# Patient Record
Sex: MALE | Race: OTHER | NOT HISPANIC OR LATINO
[De-identification: names, ages, dates, MRNs, and addresses within clinical notes are randomized per-mention and may not be internally consistent; named-entity substitution may affect disease eponyms.]

---

## 2019-06-03 ENCOUNTER — RECORD ABSTRACTING (OUTPATIENT)
Age: 49
End: 2019-06-03

## 2019-06-03 DIAGNOSIS — Z86.69 PERSONAL HISTORY OF OTHER DISEASES OF THE NERVOUS SYSTEM AND SENSE ORGANS: ICD-10-CM

## 2019-06-03 DIAGNOSIS — Z86.39 PERSONAL HISTORY OF OTHER ENDOCRINE, NUTRITIONAL AND METABOLIC DISEASE: ICD-10-CM

## 2019-06-03 PROBLEM — Z00.00 ENCOUNTER FOR PREVENTIVE HEALTH EXAMINATION: Status: ACTIVE | Noted: 2019-06-03

## 2019-06-22 ENCOUNTER — APPOINTMENT (OUTPATIENT)
Dept: ENDOCRINOLOGY | Facility: CLINIC | Age: 49
End: 2019-06-22
Payer: MEDICARE

## 2019-06-22 VITALS
HEART RATE: 84 BPM | WEIGHT: 279 LBS | HEIGHT: 71 IN | BODY MASS INDEX: 39.06 KG/M2 | OXYGEN SATURATION: 98 % | DIASTOLIC BLOOD PRESSURE: 90 MMHG | SYSTOLIC BLOOD PRESSURE: 154 MMHG

## 2019-06-22 PROCEDURE — 99214 OFFICE O/P EST MOD 30 MIN: CPT

## 2019-06-22 RX ORDER — ZOLPIDEM TARTRATE 10 MG/1
10 TABLET, FILM COATED ORAL
Refills: 0 | Status: DISCONTINUED | COMMUNITY
End: 2019-06-22

## 2019-06-22 RX ORDER — TESTOSTERONE 10 MG/G
GEL TRANSDERMAL
Refills: 0 | Status: DISCONTINUED | COMMUNITY
End: 2019-06-22

## 2019-06-22 RX ORDER — LISINOPRIL 10 MG/1
10 TABLET ORAL
Refills: 0 | Status: DISCONTINUED | COMMUNITY
End: 2019-06-22

## 2019-06-22 RX ORDER — ATORVASTATIN CALCIUM 20 MG/1
20 TABLET, FILM COATED ORAL
Refills: 0 | Status: DISCONTINUED | COMMUNITY
End: 2019-06-22

## 2019-06-22 NOTE — HISTORY OF PRESENT ILLNESS
[FreeTextEntry1] : Pt generally without new complaints.Pt with marked increase in A1c.Diet/ exercise reviewed in great detail.Lipids not adequate with increased LDL.\par \par Pt compliant with meds but trying Atkins type diet. P

## 2019-06-22 NOTE — ASSESSMENT
[FreeTextEntry1] : Pt with marked increase in A1c.Diet/ exercise reviewed in great detail.Lipids not adequate with increased LDL.

## 2020-02-29 ENCOUNTER — APPOINTMENT (OUTPATIENT)
Dept: ENDOCRINOLOGY | Facility: CLINIC | Age: 50
End: 2020-02-29
Payer: MEDICARE

## 2020-02-29 VITALS
SYSTOLIC BLOOD PRESSURE: 140 MMHG | WEIGHT: 300 LBS | DIASTOLIC BLOOD PRESSURE: 90 MMHG | HEART RATE: 86 BPM | OXYGEN SATURATION: 98 % | HEIGHT: 71 IN | BODY MASS INDEX: 42 KG/M2

## 2020-02-29 PROCEDURE — 99215 OFFICE O/P EST HI 40 MIN: CPT

## 2020-02-29 NOTE — PHYSICAL EXAM
[No Acute Distress] : no acute distress [Alert] : alert [Well Nourished] : well nourished [Well Developed] : well developed [Normal Sclera/Conjunctiva] : normal sclera/conjunctiva [EOMI] : extra ocular movement intact [No Proptosis] : no proptosis [Normal Oropharynx] : the oropharynx was normal [Thyroid Not Enlarged] : the thyroid was not enlarged [No Thyroid Nodules] : there were no palpable thyroid nodules [No Respiratory Distress] : no respiratory distress [No Accessory Muscle Use] : no accessory muscle use [Clear to Auscultation] : lungs were clear to auscultation bilaterally [Normal Rate] : heart rate was normal  [Normal S1, S2] : normal S1 and S2 [Regular Rhythm] : with a regular rhythm [No Edema] : there was no peripheral edema [Pedal Pulses Normal] : the pedal pulses are present [Normal Bowel Sounds] : normal bowel sounds [Not Tender] : non-tender [Not Distended] : not distended [Soft] : abdomen soft [Post Cervical Nodes] : posterior cervical nodes [Anterior Cervical Nodes] : anterior cervical nodes [Axillary Nodes] : axillary nodes [No Spinal Tenderness] : no spinal tenderness [Normal] : normal and non tender [Spine Straight] : spine straight [No Stigmata of Cushings Syndrome] : no stigmata of cushings syndrome [Normal Gait] : normal gait [No Rash] : no rash [Normal Strength/Tone] : muscle strength and tone were normal [Normal Reflexes] : deep tendon reflexes were 2+ and symmetric [No Tremors] : no tremors [Oriented x3] : oriented to person, place, and time [Acanthosis Nigricans] : no acanthosis nigricans

## 2020-02-29 NOTE — ASSESSMENT
[FreeTextEntry1] :  Pt. was seen and examined and we had long discussion regarding diabetic complication risks (eye exam, examining feet). Labs were fully reviewed with pt. including FBS, HbA1c,(9.1 down from 10.8)  and micro albumin and lipids. Appropriate recommendation for eye and foot care were reviewed.\par  Counseling regarding diet, home monitoring and exercise was provided. \par Values are improving  and no new symptoms of concern (discussed neuropathy symptoms, C/p, cardiac issues and GI). To stay with current regimen. \par Lipid levels were reviewed with patient and importance and function of LDL, HDL and triglycerides discussed. Methods to increase HDL (exercise, fish, beans, oat meal, legumes etc.) discussed with pt. in conjunction with measures to decrease LDL including diet and exercise.LDL/HDL was 142/38.Pt had been non compliant with lipitor\par Pt. has hypogonadism with low T and free T

## 2020-03-24 ENCOUNTER — TRANSCRIPTION ENCOUNTER (OUTPATIENT)
Age: 50
End: 2020-03-24

## 2020-04-18 ENCOUNTER — APPOINTMENT (OUTPATIENT)
Dept: ENDOCRINOLOGY | Facility: CLINIC | Age: 50
End: 2020-04-18
Payer: MEDICARE

## 2020-04-18 PROCEDURE — G2012 BRIEF CHECK IN BY MD/QHP: CPT

## 2020-08-01 ENCOUNTER — APPOINTMENT (OUTPATIENT)
Dept: ENDOCRINOLOGY | Facility: CLINIC | Age: 50
End: 2020-08-01
Payer: MEDICARE

## 2020-08-01 ENCOUNTER — APPOINTMENT (OUTPATIENT)
Dept: ENDOCRINOLOGY | Facility: CLINIC | Age: 50
End: 2020-08-01

## 2020-08-01 VITALS — BODY MASS INDEX: 38.35 KG/M2 | WEIGHT: 275 LBS

## 2020-08-01 PROCEDURE — G2012 BRIEF CHECK IN BY MD/QHP: CPT

## 2020-08-01 RX ORDER — LISINOPRIL 10 MG/1
10 TABLET ORAL
Qty: 90 | Refills: 3 | Status: DISCONTINUED | COMMUNITY
Start: 2020-02-29 | End: 2020-08-01

## 2020-08-01 RX ORDER — DULAGLUTIDE 1.5 MG/.5ML
1.5 INJECTION, SOLUTION SUBCUTANEOUS
Refills: 0 | Status: DISCONTINUED | COMMUNITY
End: 2020-08-01

## 2020-08-01 RX ORDER — METFORMIN ER 500 MG 500 MG/1
500 TABLET ORAL
Qty: 360 | Refills: 0 | Status: DISCONTINUED | COMMUNITY
Start: 2020-02-21 | End: 2020-08-01

## 2020-11-09 LAB — 25(OH)D3 SERPL-MCNC: 36 NG/ML

## 2020-11-10 LAB
ALBUMIN SERPL ELPH-MCNC: 4.7 G/DL
ALP BLD-CCNC: 62 U/L
ALT SERPL-CCNC: 15 U/L
ANION GAP SERPL CALC-SCNC: 15 MMOL/L
AST SERPL-CCNC: 14 U/L
BASOPHILS # BLD AUTO: 0.05 K/UL
BASOPHILS NFR BLD AUTO: 0.6 %
BILIRUB SERPL-MCNC: 0.5 MG/DL
BUN SERPL-MCNC: 20 MG/DL
CALCIUM SERPL-MCNC: 10.1 MG/DL
CHLORIDE SERPL-SCNC: 106 MMOL/L
CHOLEST SERPL-MCNC: 236 MG/DL
CO2 SERPL-SCNC: 25 MMOL/L
CREAT SERPL-MCNC: 1.1 MG/DL
CREAT SPEC-SCNC: 93 MG/DL
EOSINOPHIL # BLD AUTO: 0.19 K/UL
EOSINOPHIL NFR BLD AUTO: 2.5 %
ESTIMATED AVERAGE GLUCOSE: 154 MG/DL
GLUCOSE SERPL-MCNC: 116 MG/DL
HBA1C MFR BLD HPLC: 7 %
HCT VFR BLD CALC: 46.1 %
HDLC SERPL-MCNC: 42 MG/DL
HGB BLD-MCNC: 15 G/DL
IMM GRANULOCYTES NFR BLD AUTO: 0.4 %
LDLC SERPL CALC-MCNC: 159 MG/DL
LYMPHOCYTES # BLD AUTO: 2.24 K/UL
LYMPHOCYTES NFR BLD AUTO: 28.9 %
MAN DIFF?: NORMAL
MCHC RBC-ENTMCNC: 29.4 PG
MCHC RBC-ENTMCNC: 32.5 G/DL
MCV RBC AUTO: 90.4 FL
MICROALBUMIN 24H UR DL<=1MG/L-MCNC: 19.8 MG/DL
MICROALBUMIN/CREAT 24H UR-RTO: 213 MG/G
MONOCYTES # BLD AUTO: 0.46 K/UL
MONOCYTES NFR BLD AUTO: 5.9 %
NEUTROPHILS # BLD AUTO: 4.77 K/UL
NEUTROPHILS NFR BLD AUTO: 61.7 %
NONHDLC SERPL-MCNC: 194 MG/DL
PLATELET # BLD AUTO: 285 K/UL
POTASSIUM SERPL-SCNC: 4.5 MMOL/L
PROT SERPL-MCNC: 7.2 G/DL
RBC # BLD: 5.1 M/UL
RBC # FLD: 12.3 %
SARS-COV-2 IGG SERPL IA-ACNC: 136 INDEX
SARS-COV-2 IGG SERPL QL IA: POSITIVE
SODIUM SERPL-SCNC: 146 MMOL/L
TRIGL SERPL-MCNC: 284 MG/DL
VIT B12 SERPL-MCNC: 400 PG/ML
WBC # FLD AUTO: 7.74 K/UL

## 2020-11-14 ENCOUNTER — APPOINTMENT (OUTPATIENT)
Dept: ENDOCRINOLOGY | Facility: CLINIC | Age: 50
End: 2020-11-14
Payer: MEDICARE

## 2020-11-14 VITALS
BODY MASS INDEX: 38.94 KG/M2 | WEIGHT: 279.19 LBS | DIASTOLIC BLOOD PRESSURE: 86 MMHG | HEART RATE: 99 BPM | SYSTOLIC BLOOD PRESSURE: 189 MMHG | TEMPERATURE: 94.7 F | RESPIRATION RATE: 20 BRPM

## 2020-11-14 PROCEDURE — 99214 OFFICE O/P EST MOD 30 MIN: CPT

## 2020-11-14 NOTE — ASSESSMENT
[FreeTextEntry1] : I had a long discussion with patient regarding diabetes control including home readings. Goal HbA1c was discussed and counseling provided regarding diet/exercise and complications of diabetes (renal and cardiac and neurologic as well as  and need for eye exams and examination of feet. Lipids and importance of BP control also reviewed. HbA1c currently at  9.1--> 6.8 --> 7.0. and microalbumin 213 mcg/mg creatinine.\par Lipid levels were reviewed with patient and importance and function of LDL, HDL and triglycerides  { 399--> 284 discussed. Methods to increase HDL (exercise, fish, beans, oat meal, legumes etc.) discussed with pt. in conjunction with measures to decrease LDL and triglycerides  including diet and exercise.LDL/HDL was 142/38--> 159/42 . Current regimen of treatment to continue. Risks of statins were discussed in detail. Crestor 5 discussed.\par Pt. is s/p COVID with + antibodies.\par Pt. has low libido and low energy with issues related to function as well. Pt. has had  low total  testosterone and values pending  . Testosterone replacement therapy risks and benefits reviewed in detail.. Labs values were discussed in great detail. Risks of TRT and physiology discussed particularly issues related to suppression of pituitary and side effects of TRT. Issues of breast tenderness, acne, urination difficulties were fully reviewed.  To follow values.\par \par

## 2020-11-15 LAB
TESTOST BND SERPL-MCNC: 14.9 PG/ML
TESTOST SERPL-MCNC: 258.1 NG/DL

## 2021-03-26 PROBLEM — U07.1 COVID-19: Status: RESOLVED | Noted: 2021-03-26 | Resolved: 2021-03-26

## 2021-03-27 ENCOUNTER — APPOINTMENT (OUTPATIENT)
Dept: ENDOCRINOLOGY | Facility: CLINIC | Age: 51
End: 2021-03-27
Payer: MEDICARE

## 2021-03-27 VITALS
HEART RATE: 99 BPM | BODY MASS INDEX: 38.65 KG/M2 | SYSTOLIC BLOOD PRESSURE: 126 MMHG | WEIGHT: 276.1 LBS | TEMPERATURE: 97 F | OXYGEN SATURATION: 100 % | DIASTOLIC BLOOD PRESSURE: 70 MMHG | HEIGHT: 71 IN

## 2021-03-27 DIAGNOSIS — U07.1 COVID-19: ICD-10-CM

## 2021-03-27 PROCEDURE — 99214 OFFICE O/P EST MOD 30 MIN: CPT | Mod: CS

## 2021-03-27 NOTE — ASSESSMENT
[FreeTextEntry1] : I had a long discussion with patient regarding diabetes control including home readings. Goal HbA1c was discussed and counseling provided regarding diet/exercise and complications of diabetes (renal and cardiac and neurologic as well as  and need for eye exams and examination of feet. Lipids and importance of BP control also reviewed. HbA1c currently at  9.1--> 6.8 --> 7.0--> 7.9. and microalbumin 213 => 218 mcg/mg creatinine.\par Lipid levels were reviewed with patient and importance and function of LDL, HDL and triglycerides     { 399--> 284 -> 224 discussed. Methods to increase HDL (exercise, fish, beans, oat meal, legumes etc.) discussed with pt. in conjunction with measures to decrease LDL and triglycerides  including diet and exercise.LDL/HDL was 142/38--> 159/42--> 81/36. . Current regimen of treatment to continue. Risks of statins were discussed in detail. To stay with Atorvastatin 20\par Pt. is s/p COVID with + antibodies that have now converted to negative.\par Pt. has low libido and low energy with issues related to function as well. Pt. has had  low total  testosterone   . Testosterone replacement therapy risks and benefits reviewed in detail.. Labs values were discussed in great detail with value low at 292. Risks of TRT and physiology discussed particularly issues related to suppression of pituitary and side effects of TRT. Issues of breast tenderness, acne, urination difficulties were fully reviewed.  To follow values.\par Pt with stress - to try xanax .25 hs. \par

## 2021-07-21 ENCOUNTER — RX RENEWAL (OUTPATIENT)
Age: 51
End: 2021-07-21

## 2021-07-21 RX ORDER — OMEPRAZOLE 20 MG/1
20 CAPSULE, DELAYED RELEASE ORAL DAILY
Qty: 90 | Refills: 3 | Status: ACTIVE | COMMUNITY
Start: 2020-08-01 | End: 1900-01-01

## 2021-08-21 ENCOUNTER — APPOINTMENT (OUTPATIENT)
Dept: ENDOCRINOLOGY | Facility: CLINIC | Age: 51
End: 2021-08-21
Payer: MEDICARE

## 2021-08-21 VITALS
SYSTOLIC BLOOD PRESSURE: 202 MMHG | HEIGHT: 71 IN | OXYGEN SATURATION: 100 % | DIASTOLIC BLOOD PRESSURE: 98 MMHG | BODY MASS INDEX: 40.18 KG/M2 | TEMPERATURE: 94.1 F | RESPIRATION RATE: 18 BRPM | HEART RATE: 101 BPM | WEIGHT: 287 LBS

## 2021-08-21 PROCEDURE — 99214 OFFICE O/P EST MOD 30 MIN: CPT

## 2021-08-21 RX ORDER — TADALAFIL 5 MG/1
5 TABLET ORAL
Qty: 6 | Refills: 5 | Status: DISCONTINUED | COMMUNITY
Start: 2021-03-27 | End: 2021-08-21

## 2021-08-21 NOTE — ASSESSMENT
[FreeTextEntry1] : I had a long discussion with patient regarding diabetes control including home readings. Goal HbA1c was discussed and counseling provided regarding diet/exercise and complications of diabetes (renal and cardiac and neurologic as well as  and need for eye exams and examination of feet. Lipids and importance of BP control also reviewed. HbA1c currently at  8.3 from 6.8-> 7.9 so trending higher. and microalbumin improving 213 => 218=> 156 mcg/mg creatinine.\par Lipid levels were reviewed with patient and importance and function of LDL, HDL and triglycerides       { 399--> 284 -> 224 -> 189.discussed. Methods to increase HDL (exercise, fish, beans, oat meal, legumes etc.) discussed with pt. in conjunction with measures to decrease LDL and triglycerides  including diet and exercise.LDL/HDL was 142/38--> 159/42--> 81/36.=> 92/43  . Current regimen of treatment to continue. Risks of statins were discussed in detail. To stay with Atorvastatin 20\par Pt. is s/p COVID with + antibodies that have now converted to negative.\par Pt. has low libido and low energy with issues related to function as well. Pt. has had  low total  testosterone   . Testosterone replacement therapy risks and benefits reviewed in detail.. Labs values were discussed in great detail with value low at 129 down from 292. Risks of TRT and physiology discussed particularly issues related to suppression of pituitary and side effects of TRT. Issues of breast tenderness, acne, urination difficulties were fully reviewed.  To follow values.\par Pt with stress - to try xanax .25 hs.  \par Repeat /84.

## 2021-10-31 ENCOUNTER — RX RENEWAL (OUTPATIENT)
Age: 51
End: 2021-10-31

## 2021-11-13 ENCOUNTER — APPOINTMENT (OUTPATIENT)
Dept: ENDOCRINOLOGY | Facility: CLINIC | Age: 51
End: 2021-11-13

## 2022-02-24 NOTE — REASON FOR VISIT
Weak baseline swallow [Follow - Up] : a follow-up visit [DM Type 2] : DM Type 2 [Hypogonadism] : hypogonadism

## 2022-02-26 ENCOUNTER — APPOINTMENT (OUTPATIENT)
Dept: ENDOCRINOLOGY | Facility: CLINIC | Age: 52
End: 2022-02-26
Payer: MEDICARE

## 2022-02-26 VITALS
WEIGHT: 284 LBS | SYSTOLIC BLOOD PRESSURE: 200 MMHG | OXYGEN SATURATION: 98 % | DIASTOLIC BLOOD PRESSURE: 100 MMHG | HEART RATE: 109 BPM | HEIGHT: 71 IN | TEMPERATURE: 97.4 F | BODY MASS INDEX: 39.76 KG/M2

## 2022-02-26 PROCEDURE — 99213 OFFICE O/P EST LOW 20 MIN: CPT

## 2022-02-26 RX ORDER — DAPAGLIFLOZIN 5 MG/1
5 TABLET, FILM COATED ORAL DAILY
Qty: 90 | Refills: 3 | Status: DISCONTINUED | COMMUNITY
Start: 2021-08-21 | End: 2022-02-26

## 2022-02-26 NOTE — ASSESSMENT
[FreeTextEntry1] : I had a long discussion with patient regarding diabetes control including home readings. Goal HbA1c was discussed and counseling provided regarding diet/exercise and complications of diabetes (renal and cardiac and neurologic as well as  and need for eye exams and examination of feet. Lipids and importance of BP control also reviewed. HbA1c currently at 10 from  8.3  so trending higher. and previous  microalbumin 156 mcg/mg creatinine.\par Lipid levels were reviewed with patient and importance and function of LDL, HDL and triglycerides        { 399--> 284 -> 224 -> 189-> 268 ) .discussed. Methods to increase HDL (exercise, fish, beans, oat meal, legumes etc.) discussed with pt. in conjunction with measures to decrease LDL and triglycerides  including diet and exercise.LDL/HDL was 142/38--> 159/42--> 81/36.=> 92/43=> 99/41   . Current regimen of treatment to continue. Risks of statins were discussed in detail. To stay with Atorvastatin 20\par Pt. is s/p COVID with + antibodies that have now converted to negative.\par Pt. has low libido and low energy with issues related to function as well. Pt. has had  low total  testosterone   . Testosterone replacement therapy risks and benefits reviewed in detail.. Labs values were discussed in great detail with value low at 166 from 129 and previous  292. Risks of TRT and physiology discussed particularly issues related to suppression of pituitary and side effects of TRT. Issues of breast tenderness, acne, urination difficulties were fully reviewed.  To follow values.\par Pt with stress - trying  xanax .25 hs.  \par Pt. has history of hypertension. Risks related to hypertension including cardiac, renal and vascular fully discussed. Diet discussed as well. Medications and importance of compliance reviewed.\par

## 2022-03-08 ENCOUNTER — RX RENEWAL (OUTPATIENT)
Age: 52
End: 2022-03-08

## 2022-05-02 ENCOUNTER — RX RENEWAL (OUTPATIENT)
Age: 52
End: 2022-05-02

## 2022-05-28 ENCOUNTER — APPOINTMENT (OUTPATIENT)
Dept: ENDOCRINOLOGY | Facility: CLINIC | Age: 52
End: 2022-05-28
Payer: MEDICARE

## 2022-05-28 VITALS
SYSTOLIC BLOOD PRESSURE: 190 MMHG | HEART RATE: 100 BPM | BODY MASS INDEX: 39.2 KG/M2 | HEIGHT: 71 IN | WEIGHT: 280 LBS | OXYGEN SATURATION: 98 % | TEMPERATURE: 97.6 F | DIASTOLIC BLOOD PRESSURE: 110 MMHG

## 2022-05-28 VITALS — SYSTOLIC BLOOD PRESSURE: 138 MMHG | DIASTOLIC BLOOD PRESSURE: 84 MMHG

## 2022-05-28 PROCEDURE — 99213 OFFICE O/P EST LOW 20 MIN: CPT

## 2022-05-28 NOTE — ASSESSMENT
[FreeTextEntry1] : I had a long discussion with patient regarding diabetes control including home readings. Goal HbA1c was discussed and counseling provided regarding diet/exercise and complications of diabetes (renal and cardiac and neurologic as well as  and need for eye exams and examination of feet. Lipids and importance of BP control also reviewed. HbA1c currently at 7.9  so trending lower and microalbumin 259 mcg/mg creatinine.\par Lipid levels were reviewed with patient and importance and function of LDL, HDL and triglycerides          { 399--> 284 -> 224 -> 189-> 268->235   ) .discussed. Methods to increase HDL (exercise, fish, beans, oat meal, legumes etc.) discussed with pt. in conjunction with measures to decrease LDL and triglycerides  including diet and exercise.LDL/HDL was 142/38--> 159/42--> 81/36.=> 92/43=> 99/41 => 71/41   . Current regimen of treatment to continue. Risks of statins were discussed in detail. To stay with Atorvastatin 20\par Pt. is s/p COVID with + antibodies that have now converted to negative.\par Pt. has low libido and low energy with issues related to function as well. Pt. has had  low total  testosterone   . Testosterone replacement therapy risks and benefits reviewed in detail.. Labs values were discussed in great detail with value low at  138 from 166 from 129 and previous  292. Risks of TRT and physiology discussed particularly issues related to suppression of pituitary and side effects of TRT. Issues of breast tenderness, acne, urination difficulties were fully reviewed.  To follow values.To hold off on gel.\par Pt with stress - trying  xanax .25 hs.  \par Pt. has history of hypertension. Risks related to hypertension including cardiac, renal and vascular fully discussed. Diet discussed as well. Medications and importance of compliance reviewed.\par PSA was 0.18. \par BP repeat 138/84.

## 2022-08-03 ENCOUNTER — RX RENEWAL (OUTPATIENT)
Age: 52
End: 2022-08-03

## 2022-09-07 NOTE — ASSESSMENT
[FreeTextEntry1] : I had a long discussion with patient regarding diabetes control including home readings. Goal HbA1c was discussed and counseling provided regarding diet/exercise and complications of diabetes (renal and cardiac and neurologic as well as  and need for eye exams and examination of feet. Lipids and importance of BP control also reviewed. HbA1c currently at 7.7  from  7.9  so trending lower and microalbumin 143 down from  259 mcg/mg creatinine.\par Lipid levels were reviewed with patient and importance and function of LDL, HDL and triglycerides discussed. Methods to increase HDL (exercise, fish, beans, oat meal, legumes etc.) discussed with pt. in conjunction with measures to decrease LDL and triglycerides  including diet and exercise.LDL/HDL was 66/39 from 71/41. . Triglycerides were increased ,f 235 to 348. .Current regimen of treatment to continue. Risks/benefits  of statins were discussed in detail.  To stay with Atorvastatin 20. Addition of Vascepa discussed. \par Pt. is s/p COVID with + antibodies that have now converted to negative.\par Pt. has low libido and low energy with issues related to function as well. Pt. has had  low total  testosterone   . Testosterone replacement therapy risks and benefits reviewed in detail.. Labs values were discussed in great detail with value low at  138 from 166 from 129 and previous  292. Risks of TRT and physiology discussed particularly issues related to suppression of pituitary and side effects of TRT. Issues of breast tenderness, acne, urination difficulties were fully reviewed.  To follow values.To hold off on gel.\par Pt with stress - trying  xanax .25 hs.  \par Pt. has history of hypertension. Risks related to hypertension including cardiac, renal and vascular fully discussed. Diet discussed as well. Medications and importance of compliance reviewed.\par The previous PSA was 0.18. \par

## 2022-09-10 ENCOUNTER — APPOINTMENT (OUTPATIENT)
Dept: ENDOCRINOLOGY | Facility: CLINIC | Age: 52
End: 2022-09-10

## 2022-09-10 VITALS
HEIGHT: 71 IN | WEIGHT: 285 LBS | SYSTOLIC BLOOD PRESSURE: 190 MMHG | DIASTOLIC BLOOD PRESSURE: 100 MMHG | HEART RATE: 95 BPM | TEMPERATURE: 97.4 F | BODY MASS INDEX: 39.9 KG/M2 | OXYGEN SATURATION: 98 %

## 2022-09-10 PROCEDURE — 99213 OFFICE O/P EST LOW 20 MIN: CPT

## 2022-09-10 NOTE — ASSESSMENT
[FreeTextEntry1] : I had a long discussion with patient regarding diabetes control including home readings. Goal HbA1c was discussed and counseling provided regarding diet/exercise and complications of diabetes (renal and cardiac and neurologic as well as  and need for eye exams and examination of feet. Lipids and importance of BP control also reviewed. HbA1c currently at 7.7  from  7.9  so trending lower and microalbumin 143 down from  259 mcg/mg creatinine.\par Lipid levels were reviewed with patient and importance and function of LDL, HDL and triglycerides discussed. Methods to increase HDL (exercise, fish, beans, oat meal, legumes etc.) discussed with pt. in conjunction with measures to decrease LDL and triglycerides  including diet and exercise.LDL/HDL was 66/39 from 71/41. . Triglycerides were increased ,f 235 to 348. .Current regimen of treatment to continue. Risks/benefits  of statins were discussed in detail.  To stay with Atorvastatin 20. Addition of Vascepa discussed. \par Pt. is s/p COVID with + antibodies that have now converted to negative.\par Pt. has low libido and low energy with issues related to function as well. Pt. has had  low total  testosterone   . Testosterone replacement therapy risks and benefits reviewed in detail.. Labs values were discussed in great detail with value low at  175  138 from 166 from 129 and previous  292. Risks of TRT and physiology discussed particularly issues related to suppression of pituitary and side effects of TRT. Issues of breast tenderness, acne, urination difficulties were fully reviewed.  To follow values.To hold off on gel.\par Pt with stress - trying  xanax .25 hs.  \par Pt. has history of hypertension. Risks related to hypertension including cardiac, renal and vascular fully discussed. Diet discussed as well. Medications and importance of compliance reviewed.\par The previous PSA was 0.18.\par BP recheck 138/86 \par

## 2022-11-23 ENCOUNTER — RX RENEWAL (OUTPATIENT)
Age: 52
End: 2022-11-23

## 2022-12-10 ENCOUNTER — RX RENEWAL (OUTPATIENT)
Age: 52
End: 2022-12-10

## 2023-03-04 ENCOUNTER — APPOINTMENT (OUTPATIENT)
Dept: ENDOCRINOLOGY | Facility: CLINIC | Age: 53
End: 2023-03-04

## 2023-03-12 ENCOUNTER — RX RENEWAL (OUTPATIENT)
Age: 53
End: 2023-03-12

## 2023-03-17 ENCOUNTER — RX RENEWAL (OUTPATIENT)
Age: 53
End: 2023-03-17

## 2023-05-06 ENCOUNTER — APPOINTMENT (OUTPATIENT)
Dept: ENDOCRINOLOGY | Facility: CLINIC | Age: 53
End: 2023-05-06
Payer: MEDICARE

## 2023-05-06 VITALS
SYSTOLIC BLOOD PRESSURE: 170 MMHG | WEIGHT: 289 LBS | DIASTOLIC BLOOD PRESSURE: 100 MMHG | HEIGHT: 71 IN | BODY MASS INDEX: 40.46 KG/M2

## 2023-05-06 PROCEDURE — 99212 OFFICE O/P EST SF 10 MIN: CPT

## 2023-05-06 NOTE — ASSESSMENT
[FreeTextEntry1] : Pt stressed with mother's septic joint and then . so very poor diet and very high stress.\par I had a long discussion with patient regarding diabetes control including home readings. Goal HbA1c was discussed and counseling provided regarding diet/exercise and complications of diabetes (renal and cardiac and neurologic as well as  and need for eye exams and examination of feet. Lipids and importance of BP control also reviewed. HbA1c currently at 9.8 from 7.7 so trending HIGH and microalbumin  458 from 143 &   259 mcg/mg creatinine.To try mounjaro or increase to 3.\par Lipid levels were reviewed with patient and importance and function of LDL, HDL and triglycerides discussed. Methods to increase HDL (exercise, fish, beans, oat meal, legumes etc.) discussed with pt. in conjunction with measures to decrease LDL and triglycerides  including diet and exercise.LDL/HDL was 111/3 from 66/39 & 71/41. . Triglycerides were increased from 235 -> 348-> 369. . .Current regimen of treatment to continue. Risks/benefits  of statins were discussed in detail.  To stay with Atorvastatin 20. Addition of Vascepa discussed. \par Pt. has low libido and low energy with issues related to function as well. Pt. has had  low total  testosterone   . Testosterone replacement therapy risks and benefits reviewed in detail.. Labs values were discussed in great detail with value low at  212 from  175,138, 166. 129 and previous  292. Risks of TRT and physiology discussed particularly issues related to suppression of pituitary and side effects of TRT. Issues of breast tenderness, acne, urination difficulties were fully reviewed.  To follow values.Suggested [facility]ta for TRT. \par Pt with stress - trying  xanax .25 hs.  \par Pt. has history of hypertension. Risks related to hypertension including cardiac, renal and vascular fully discussed. Diet discussed as well. Medications and importance of compliance reviewed.\par The previous PSA was 0.18.\par \par

## 2023-09-09 ENCOUNTER — APPOINTMENT (OUTPATIENT)
Dept: ENDOCRINOLOGY | Facility: CLINIC | Age: 53
End: 2023-09-09
Payer: MEDICARE

## 2023-09-09 VITALS
SYSTOLIC BLOOD PRESSURE: 190 MMHG | HEART RATE: 95 BPM | WEIGHT: 288 LBS | DIASTOLIC BLOOD PRESSURE: 98 MMHG | OXYGEN SATURATION: 98 % | TEMPERATURE: 97.2 F | BODY MASS INDEX: 40.32 KG/M2 | HEIGHT: 71 IN

## 2023-09-09 PROCEDURE — 99213 OFFICE O/P EST LOW 20 MIN: CPT

## 2023-09-09 NOTE — ASSESSMENT
[FreeTextEntry1] : Pt stressed with mother's septic joint and then . so very poor diet and very high stress. I had a long discussion with patient regarding diabetes control including home readings. Goal HbA1c was discussed, and counseling provided regarding diet/exercise and complications of diabetes (renal and cardiac and neurologic as well as need for eye exams and examination of feet. Lipids and importance of BP control also reviewed. HbA1c currently at 7.2 from 9.8 & 7.7 so trending better and microalbumin 553 from 458, 143 & 259 mcg/mg creatinine. To try mounjaro or increase to 3. Lipid levels were reviewed with patient and importance and function of LDL, HDL and triglycerides discussed. Methods to increase HDL (exercise, fish, beans, oatmeal, legumes etc.) discussed with pt. in conjunction with measures to decrease LDL and triglycerides including diet and exercise.LDL/HDL was 83/38. Triglycerides were 303 from 369. . .Current regimen of treatment to continue. Risks/benefits of statins were discussed in detail.  To stay with Atorvastatin 20. Addition of Vascepa discussed.  Pt. has low libido and low energy with issues related to function as well. Pt. has had low total testosterone   . Testosterone replacement therapy risks and benefits reviewed in detail. Labs values were discussed in great detail with previous values low at 212, 175,138, 166. 129 and 292. The current Free T was normal at 71.3. Risks of TRT and physiology discussed particularly issues related to suppression of pituitary and side effects of TRT. Issues of breast tenderness, acne, urination difficulties were fully reviewed.  To follow values. Suggested [facility] for TRT. and their consult notes related to TRT read, appreciated and discussed with the patient,  Esradiol elevated at 52 (N<39)    Pt with stress - trying  xanax .25 hs.   Pt. has history of hypertension. Risks related to hypertension including cardiac, renal and vascular fully discussed. Diet discussed as well. Medications and importance of compliance reviewed. The previous PSA was 0.18. Pt had sht in orbit for macula degeneration.

## 2023-09-25 ENCOUNTER — RX RENEWAL (OUTPATIENT)
Age: 53
End: 2023-09-25

## 2023-09-25 RX ORDER — ATORVASTATIN CALCIUM 20 MG/1
20 TABLET, FILM COATED ORAL
Qty: 90 | Refills: 3 | Status: ACTIVE | COMMUNITY
Start: 2020-11-14 | End: 1900-01-01

## 2023-09-27 ENCOUNTER — APPOINTMENT (OUTPATIENT)
Dept: CARDIOLOGY | Facility: CLINIC | Age: 53
End: 2023-09-27
Payer: MEDICARE

## 2023-10-30 ENCOUNTER — RX RENEWAL (OUTPATIENT)
Age: 53
End: 2023-10-30

## 2023-11-15 ENCOUNTER — APPOINTMENT (OUTPATIENT)
Dept: CARDIOLOGY | Facility: CLINIC | Age: 53
End: 2023-11-15
Payer: MEDICARE

## 2023-11-15 VITALS
DIASTOLIC BLOOD PRESSURE: 100 MMHG | BODY MASS INDEX: 40.18 KG/M2 | HEART RATE: 106 BPM | WEIGHT: 287 LBS | SYSTOLIC BLOOD PRESSURE: 192 MMHG | HEIGHT: 71 IN

## 2023-11-15 DIAGNOSIS — Z78.9 OTHER SPECIFIED HEALTH STATUS: ICD-10-CM

## 2023-11-15 DIAGNOSIS — Z86.59 PERSONAL HISTORY OF OTHER MENTAL AND BEHAVIORAL DISORDERS: ICD-10-CM

## 2023-11-15 DIAGNOSIS — R00.2 PALPITATIONS: ICD-10-CM

## 2023-11-15 DIAGNOSIS — Z83.438 FAMILY HISTORY OF OTHER DISORDER OF LIPOPROTEIN METABOLISM AND OTHER LIPIDEMIA: ICD-10-CM

## 2023-11-15 PROCEDURE — 99204 OFFICE O/P NEW MOD 45 MIN: CPT

## 2023-11-15 PROCEDURE — 93000 ELECTROCARDIOGRAM COMPLETE: CPT

## 2023-11-15 RX ORDER — TIRZEPATIDE 5 MG/.5ML
5 INJECTION, SOLUTION SUBCUTANEOUS
Qty: 8 | Refills: 4 | Status: COMPLETED | COMMUNITY
Start: 2023-05-06 | End: 2023-11-15

## 2023-11-15 RX ORDER — METOPROLOL TARTRATE 100 MG/1
100 TABLET, FILM COATED ORAL
Qty: 3 | Refills: 0 | Status: ACTIVE | COMMUNITY
Start: 2023-11-15 | End: 1900-01-01

## 2023-11-15 RX ORDER — TESTOSTERONE CYPIONATE 200 MG/ML
200 VIAL (ML) INTRAMUSCULAR
Qty: 1 | Refills: 0 | Status: ACTIVE | COMMUNITY
Start: 2023-11-15

## 2023-11-15 RX ORDER — SILDENAFIL 100 MG/1
100 TABLET, FILM COATED ORAL
Qty: 30 | Refills: 5 | Status: DISCONTINUED | COMMUNITY
Start: 2022-09-10 | End: 2023-11-15

## 2023-11-21 ENCOUNTER — APPOINTMENT (OUTPATIENT)
Dept: CARDIOLOGY | Facility: CLINIC | Age: 53
End: 2023-11-21
Payer: MEDICARE

## 2023-11-21 PROCEDURE — 93228 REMOTE 30 DAY ECG REV/REPORT: CPT

## 2023-11-24 RX ORDER — GLIMEPIRIDE 2 MG/1
2 TABLET ORAL
Qty: 180 | Refills: 3 | Status: ACTIVE | COMMUNITY
Start: 2019-06-22 | End: 1900-01-01

## 2023-12-02 ENCOUNTER — APPOINTMENT (OUTPATIENT)
Dept: ENDOCRINOLOGY | Facility: CLINIC | Age: 53
End: 2023-12-02
Payer: MEDICARE

## 2023-12-02 VITALS
HEIGHT: 71 IN | WEIGHT: 280 LBS | SYSTOLIC BLOOD PRESSURE: 172 MMHG | OXYGEN SATURATION: 98 % | BODY MASS INDEX: 39.2 KG/M2 | DIASTOLIC BLOOD PRESSURE: 100 MMHG | HEART RATE: 100 BPM

## 2023-12-02 VITALS — SYSTOLIC BLOOD PRESSURE: 144 MMHG | DIASTOLIC BLOOD PRESSURE: 80 MMHG

## 2023-12-02 PROCEDURE — 99213 OFFICE O/P EST LOW 20 MIN: CPT

## 2023-12-02 RX ORDER — DULAGLUTIDE 3 MG/.5ML
3 INJECTION, SOLUTION SUBCUTANEOUS
Qty: 13 | Refills: 3 | Status: ACTIVE | COMMUNITY
Start: 2022-02-26 | End: 1900-01-01

## 2024-01-11 ENCOUNTER — RX RENEWAL (OUTPATIENT)
Age: 54
End: 2024-01-11

## 2024-01-11 RX ORDER — METFORMIN HYDROCHLORIDE 500 MG/1
500 TABLET, COATED ORAL
Qty: 360 | Refills: 3 | Status: ACTIVE | COMMUNITY
Start: 2022-05-02 | End: 1900-01-01

## 2024-01-26 ENCOUNTER — APPOINTMENT (OUTPATIENT)
Dept: CARDIOLOGY | Facility: CLINIC | Age: 54
End: 2024-01-26
Payer: MEDICARE

## 2024-01-26 PROCEDURE — 93306 TTE W/DOPPLER COMPLETE: CPT

## 2024-02-07 ENCOUNTER — RX RENEWAL (OUTPATIENT)
Age: 54
End: 2024-02-07

## 2024-02-07 RX ORDER — BLOOD SUGAR DIAGNOSTIC
STRIP MISCELLANEOUS 3 TIMES DAILY
Qty: 300 | Refills: 3 | Status: ACTIVE | COMMUNITY
Start: 2019-09-10 | End: 1900-01-01

## 2024-02-29 ENCOUNTER — APPOINTMENT (OUTPATIENT)
Dept: CARDIOLOGY | Facility: CLINIC | Age: 54
End: 2024-02-29
Payer: MEDICARE

## 2024-02-29 VITALS — HEART RATE: 97 BPM | SYSTOLIC BLOOD PRESSURE: 146 MMHG | DIASTOLIC BLOOD PRESSURE: 96 MMHG

## 2024-02-29 VITALS — HEIGHT: 71 IN | WEIGHT: 276 LBS | TEMPERATURE: 97.6 F | BODY MASS INDEX: 38.64 KG/M2

## 2024-02-29 PROCEDURE — 99214 OFFICE O/P EST MOD 30 MIN: CPT

## 2024-02-29 PROCEDURE — 93000 ELECTROCARDIOGRAM COMPLETE: CPT

## 2024-02-29 RX ORDER — METOPROLOL TARTRATE 100 MG/1
100 TABLET, FILM COATED ORAL
Qty: 4 | Refills: 0 | Status: ACTIVE | COMMUNITY
Start: 2024-02-29 | End: 1900-01-01

## 2024-02-29 RX ORDER — RAMIPRIL 10 MG/1
10 CAPSULE ORAL DAILY
Qty: 90 | Refills: 3 | Status: ACTIVE | COMMUNITY
Start: 2021-03-27 | End: 1900-01-01

## 2024-02-29 NOTE — PHYSICAL EXAM
[Well Developed] : well developed [Well Nourished] : well nourished [No Acute Distress] : no acute distress [Normal Conjunctiva] : normal conjunctiva [No Carotid Bruit] : no carotid bruit [Normal Venous Pressure] : normal venous pressure [5th Left ICS - MCL] : palpated at the 5th LICS in the midclavicular line [Normal] : normal [No Precordial Heave] : no precordial heave was noted [Normal Rate] : normal [Rhythm Regular] : regular [Normal S1] : normal S1 [Normal S2] : normal S2 [No Murmur] : no murmurs heard [___ +] : bilateral [unfilled]U+ pretibial pitting edema [2+] : left 2+ [Clear Lung Fields] : clear lung fields [Good Air Entry] : good air entry [No Respiratory Distress] : no respiratory distress  [Soft] : abdomen soft [Non Tender] : non-tender [No Masses/organomegaly] : no masses/organomegaly [Normal Bowel Sounds] : normal bowel sounds [Normal Gait] : normal gait [No Edema] : no edema [No Cyanosis] : no cyanosis [No Clubbing] : no clubbing [No Varicosities] : no varicosities [No Rash] : no rash [No Skin Lesions] : no skin lesions [Moves all extremities] : moves all extremities [No Focal Deficits] : no focal deficits [Normal Speech] : normal speech [Alert and Oriented] : alert and oriented [Normal memory] : normal memory

## 2024-02-29 NOTE — ASSESSMENT
[FreeTextEntry1] : Palpitations: Could be due to anxiety, but cannot rule out arrhythmia.  Normal MCOT and TTE.  -Check CCTA.  -Metoprolol premed sent. Increased dose.   HLD: , , HDL 38, LDL 83 (08/23)->, , HDL 36, LDL 64 (11/23) -Continue with atorvastatin 20mg PO daily. -Discussed therapeutic lifestyle changes to promote improved lipid metabolism.   HTN: BP not at goal per ACC/AHA 2018 guidelines -Per patient, white coat hypertension. -Pt to check at home and keep log.  -Continue with ramipril 5mg->10mg PO daily.   DM:  HA1c 7.2%->7.8% (11/23) -Continue with metformin, glimeperide and Trulicity.  -Continued endo, podiatry and ophtho follow up.   Follow up in 3 months.

## 2024-02-29 NOTE — REASON FOR VISIT
[FreeTextEntry1] : Diagnostic Tests: ------------------------ EK24-NSR. RBBB 11/15/23-Sinus tachycardia. RBBB.  ------------------------- Echo:  24-TTE: EF 60%. Normal TTE.  ------------------------- Holter:  11/15-23-MCOT 1 week: HR  bpm (84 bpm). 1% PVC/PAC. No arrhythmias.

## 2024-02-29 NOTE — HISTORY OF PRESENT ILLNESS
[FreeTextEntry1] : Mr. Manzano is a 52yo M with PMHx of HTN, white-coat HTN, HLD, DM, KWAME who presents for follow up. His PMD is Dr. Shree Vu. He complains of palpitations. He has been having anxiety after life stressors, including divorce and his mother passing away. With palpitations, some chest tightness. Otherwise active without exertional SOB.  02/29/24-Patient tried going for CCTA but unable to get HR to goal. He is feeling better overall.

## 2024-02-29 NOTE — REVIEW OF SYSTEMS
[Chest Discomfort] : chest discomfort [Palpitations] : palpitations [Negative] : Heme/Lymph [Dyspnea on exertion] : not dyspnea during exertion [SOB] : no shortness of breath

## 2024-03-11 ENCOUNTER — EMERGENCY (EMERGENCY)
Facility: HOSPITAL | Age: 54
LOS: 0 days | Discharge: HOME | End: 2024-03-11
Attending: EMERGENCY MEDICINE
Payer: MEDICARE

## 2024-03-11 VITALS
SYSTOLIC BLOOD PRESSURE: 221 MMHG | DIASTOLIC BLOOD PRESSURE: 118 MMHG | WEIGHT: 274.92 LBS | HEART RATE: 106 BPM | RESPIRATION RATE: 19 BRPM | TEMPERATURE: 96 F | OXYGEN SATURATION: 99 %

## 2024-03-11 VITALS
HEART RATE: 90 BPM | DIASTOLIC BLOOD PRESSURE: 91 MMHG | RESPIRATION RATE: 19 BRPM | SYSTOLIC BLOOD PRESSURE: 181 MMHG | OXYGEN SATURATION: 99 %

## 2024-03-11 DIAGNOSIS — J02.9 ACUTE PHARYNGITIS, UNSPECIFIED: ICD-10-CM

## 2024-03-11 DIAGNOSIS — R07.0 PAIN IN THROAT: ICD-10-CM

## 2024-03-11 LAB
ALBUMIN SERPL ELPH-MCNC: 4.4 G/DL — SIGNIFICANT CHANGE UP (ref 3.5–5.2)
ALP SERPL-CCNC: 88 U/L — SIGNIFICANT CHANGE UP (ref 30–115)
ALT FLD-CCNC: 19 U/L — SIGNIFICANT CHANGE UP (ref 0–41)
ANION GAP SERPL CALC-SCNC: 10 MMOL/L — SIGNIFICANT CHANGE UP (ref 7–14)
AST SERPL-CCNC: 17 U/L — SIGNIFICANT CHANGE UP (ref 0–41)
BILIRUB SERPL-MCNC: 0.5 MG/DL — SIGNIFICANT CHANGE UP (ref 0.2–1.2)
BUN SERPL-MCNC: 16 MG/DL — SIGNIFICANT CHANGE UP (ref 10–20)
CALCIUM SERPL-MCNC: 9.4 MG/DL — SIGNIFICANT CHANGE UP (ref 8.4–10.5)
CHLORIDE SERPL-SCNC: 97 MMOL/L — LOW (ref 98–110)
CO2 SERPL-SCNC: 30 MMOL/L — SIGNIFICANT CHANGE UP (ref 17–32)
CREAT SERPL-MCNC: 1.3 MG/DL — SIGNIFICANT CHANGE UP (ref 0.7–1.5)
EGFR: 65 ML/MIN/1.73M2 — SIGNIFICANT CHANGE UP
GLUCOSE SERPL-MCNC: 145 MG/DL — HIGH (ref 70–99)
HCT VFR BLD CALC: 46.2 % — SIGNIFICANT CHANGE UP (ref 42–52)
HGB BLD-MCNC: 16.5 G/DL — SIGNIFICANT CHANGE UP (ref 14–18)
MCHC RBC-ENTMCNC: 30.2 PG — SIGNIFICANT CHANGE UP (ref 27–31)
MCHC RBC-ENTMCNC: 35.7 G/DL — SIGNIFICANT CHANGE UP (ref 32–37)
MCV RBC AUTO: 84.6 FL — SIGNIFICANT CHANGE UP (ref 80–94)
NRBC # BLD: 0 /100 WBCS — SIGNIFICANT CHANGE UP (ref 0–0)
PLATELET # BLD AUTO: 332 K/UL — SIGNIFICANT CHANGE UP (ref 130–400)
PMV BLD: 9.7 FL — SIGNIFICANT CHANGE UP (ref 7.4–10.4)
POTASSIUM SERPL-MCNC: 4.4 MMOL/L — SIGNIFICANT CHANGE UP (ref 3.5–5)
POTASSIUM SERPL-SCNC: 4.4 MMOL/L — SIGNIFICANT CHANGE UP (ref 3.5–5)
PROT SERPL-MCNC: 7.7 G/DL — SIGNIFICANT CHANGE UP (ref 6–8)
RBC # BLD: 5.46 M/UL — SIGNIFICANT CHANGE UP (ref 4.7–6.1)
RBC # FLD: 13.2 % — SIGNIFICANT CHANGE UP (ref 11.5–14.5)
SODIUM SERPL-SCNC: 137 MMOL/L — SIGNIFICANT CHANGE UP (ref 135–146)
WBC # BLD: 13.44 K/UL — HIGH (ref 4.8–10.8)
WBC # FLD AUTO: 13.44 K/UL — HIGH (ref 4.8–10.8)

## 2024-03-11 PROCEDURE — 85027 COMPLETE CBC AUTOMATED: CPT

## 2024-03-11 PROCEDURE — 80053 COMPREHEN METABOLIC PANEL: CPT

## 2024-03-11 PROCEDURE — 99284 EMERGENCY DEPT VISIT MOD MDM: CPT | Mod: FS

## 2024-03-11 PROCEDURE — 99283 EMERGENCY DEPT VISIT LOW MDM: CPT

## 2024-03-11 PROCEDURE — 36415 COLL VENOUS BLD VENIPUNCTURE: CPT

## 2024-03-11 RX ORDER — DEXAMETHASONE 0.5 MG/5ML
10 ELIXIR ORAL ONCE
Refills: 0 | Status: COMPLETED | OUTPATIENT
Start: 2024-03-11 | End: 2024-03-11

## 2024-03-11 RX ADMIN — Medication 10 MILLIGRAM(S): at 11:54

## 2024-03-11 NOTE — ED PROVIDER NOTE - PHYSICAL EXAMINATION
Throat positive pharyngeal erythema no exudates noted on exam no lymph nodes on palpation    NECK: Supple; non tender.  CARD: S1, S2, Regular rate and rhythm.   RESP: No wheezes, rales or rhonchi.

## 2024-03-11 NOTE — ED PROVIDER NOTE - CLINICAL SUMMARY MEDICAL DECISION MAKING FREE TEXT BOX
Physical exam overall the patient is well-appearing normocephalic atraumatic pupils equal round react light accommodation extraocular muscles intact oropharynx mild erythema noted no exudates no swelling of the tongue uvula patient requested lab work which was done white blood cell count 13.44 full range of motion of the neck presentation is consistent with pharyngitis patient is already taking p.o. antibiotics I will discharge at this time as he has no signs of central infection or other abnormalities noted we discussed indications to return patient proved here in the emergency department I will discharge

## 2024-03-11 NOTE — ED ADULT NURSE NOTE - NSFALLUNIVINTERV_ED_ALL_ED
Bed/Stretcher in lowest position, wheels locked, appropriate side rails in place/Call bell, personal items and telephone in reach/Instruct patient to call for assistance before getting out of bed/chair/stretcher/Non-slip footwear applied when patient is off stretcher/[place] to call system/Physically safe environment - no spills, clutter or unnecessary equipment/Purposeful proactive rounding/Room/bathroom lighting operational, light cord in reach

## 2024-03-11 NOTE — ED PROVIDER NOTE - PATIENT PORTAL LINK FT
You can access the FollowMyHealth Patient Portal offered by [facility] by registering at the following website: [email or web address]. By joining [facility]’s FollowMyHealth portal, you will also be able to view your health information using other applications (apps) compatible with our system.

## 2024-03-11 NOTE — ED PROVIDER NOTE - ATTENDING APP SHARED VISIT CONTRIBUTION OF CARE
Improved. I have personally performed a history and physical exam on this patient and personally directed the management of the patient. Patient is a 53-year-old male presents emergency department for evaluation of sore throat patient is taking p.o. antibiotics of note patient has ENT follow-up scheduled states that he is having worsening pain however is able to tolerate p.o. food and fluids no vomiting denies any headache visual changes neck pain chest pain shortness of breath abdominal pain fevers chills rashes    Physical exam overall the patient is well-appearing normocephalic atraumatic pupils equal round react light accommodation extraocular muscles intact oropharynx mild erythema noted no exudates no swelling of the tongue uvula patient requested lab work which was done white blood cell count 13.44 full range of motion of the neck presentation is consistent with pharyngitis patient is already taking p.o. antibiotics I will discharge at this time as he has no signs of central infection or other abnormalities noted we discussed indications to return patient proved here in the emergency department I will discharge

## 2024-03-11 NOTE — ED PROVIDER NOTE - OBJECTIVE STATEMENT
53-year-old male presents to the ED for evaluation of sore throat.  Patient is currently on antibiotics and has an appointment with ENT tomorrow.

## 2024-04-02 ENCOUNTER — RX RENEWAL (OUTPATIENT)
Age: 54
End: 2024-04-02

## 2024-04-02 RX ORDER — RAMIPRIL 5 MG/1
5 CAPSULE ORAL
Qty: 90 | Refills: 3 | Status: ACTIVE | COMMUNITY
Start: 2024-04-02 | End: 1900-01-01

## 2024-04-05 PROBLEM — E29.1 HYPOGONADISM IN MALE: Status: ACTIVE | Noted: 2021-08-20

## 2024-04-05 PROBLEM — N40.0 BPH (BENIGN PROSTATIC HYPERPLASIA): Status: ACTIVE | Noted: 2022-09-10

## 2024-04-05 PROBLEM — I10 ESSENTIAL HYPERTENSION: Status: ACTIVE | Noted: 2023-09-22

## 2024-04-05 PROBLEM — E78.00 HYPERCHOLESTEROLEMIA: Status: ACTIVE | Noted: 2019-06-03

## 2024-04-05 PROBLEM — E11.39 TYPE 2 DIABETES MELLITUS WITH OTHER OPHTHALMIC COMPLICATION: Status: ACTIVE | Noted: 2019-06-22

## 2024-04-06 ENCOUNTER — APPOINTMENT (OUTPATIENT)
Dept: ENDOCRINOLOGY | Facility: CLINIC | Age: 54
End: 2024-04-06
Payer: MEDICARE

## 2024-04-06 VITALS
SYSTOLIC BLOOD PRESSURE: 165 MMHG | HEIGHT: 71 IN | OXYGEN SATURATION: 99 % | HEART RATE: 115 BPM | WEIGHT: 280 LBS | BODY MASS INDEX: 39.2 KG/M2 | DIASTOLIC BLOOD PRESSURE: 90 MMHG

## 2024-04-06 DIAGNOSIS — E78.00 PURE HYPERCHOLESTEROLEMIA, UNSPECIFIED: ICD-10-CM

## 2024-04-06 DIAGNOSIS — N40.0 BENIGN PROSTATIC HYPERPLASIA WITHOUT LOWER URINARY TRACT SYMPMS: ICD-10-CM

## 2024-04-06 DIAGNOSIS — E11.39 TYPE 2 DIABETES MELLITUS WITH OTHER DIABETIC OPHTHALMIC COMPLICATION: ICD-10-CM

## 2024-04-06 DIAGNOSIS — E29.1 TESTICULAR HYPOFUNCTION: ICD-10-CM

## 2024-04-06 DIAGNOSIS — I10 ESSENTIAL (PRIMARY) HYPERTENSION: ICD-10-CM

## 2024-04-06 PROCEDURE — 99214 OFFICE O/P EST MOD 30 MIN: CPT

## 2024-04-06 RX ORDER — TADALAFIL 5 MG/1
5 TABLET ORAL
Qty: 90 | Refills: 3 | Status: ACTIVE | COMMUNITY
Start: 2024-04-06 | End: 1900-01-01

## 2024-04-06 NOTE — ASSESSMENT
[FreeTextEntry1] : Pt s/p thrush and was on nystatin. Followed by ENT.  I had a long discussion with patient regarding diabetes control including home readings. Goal HbA1c was discussed, and counseling provided regarding diet/exercise and complications of diabetes (renal and cardiac and neurologic as well as need for eye exams and examination of feet. Lipids and importance of BP control also reviewed. HbA1c currently at 7.7 from 7.5, 7.2, 9.8 & 7.7 and microalbumin 633 from 595, 553, 458, 143 & 259 mcg/mg creatinine. Trulicity increased to 3. Lipid levels were reviewed with patient and importance and function of LDL, HDL and triglycerides discussed. Methods to increase HDL (exercise, fish, beans, oatmeal, legumes etc.) discussed with pt. in conjunction with measures to decrease LDL and triglycerides including diet and exercise. LDL/HDL was 93/49 from 64/36 & 83/38. Triglycerides were 140 from 303 & 369...Current regimen of treatment to continue. Risks/benefits of statins were discussed in detail. To stay with Atorvastatin 20. Addition of Vascepa discussed. Pt. has low libido and low energy with issues related to function as well. Pt. has had low total testosterone. Testosterone replacement therapy risks and benefits reviewed in detail. Labs values were discussed in great detail with previous values low at 212, 175,138, 166. 129 and 292. The current testosterone was 880 from 488 and free 232.5 from 127.9 so normal values on 400 mg monthly. Risks of TRT and physiology discussed particularly issues related to suppression of pituitary and side effects of TRT. Issues of breast tenderness, acne, urination difficulties were fully reviewed. Reviewed notes from Dr. River for TRT. and their consult notes related to TRT read, appreciated and discussed with the patient, Estradiol elevated at 62 from 60 & 52 (N<39). Arimidex discussed. To stay with current since no symptoms of breast tenderness.  Pt with stress - trying xanax.25 hs. Pt. has history of hypertension. Risks related to hypertension including cardiac, renal and vascular fully discussed. Diet discussed as well. Medications and importance of compliance reviewed. The previous PSA was 0.18. Pt had shot in orbit for macular degeneration.

## 2024-04-16 RX ORDER — ALPRAZOLAM 0.25 MG/1
0.25 TABLET ORAL
Qty: 10 | Refills: 0 | Status: ACTIVE | COMMUNITY
Start: 2023-12-05 | End: 1900-01-01

## 2024-04-25 RX ORDER — TIRZEPATIDE 5 MG/.5ML
5 INJECTION, SOLUTION SUBCUTANEOUS
Qty: 2 | Refills: 3 | Status: ACTIVE | COMMUNITY
Start: 2024-04-25 | End: 1900-01-01

## 2024-06-12 ENCOUNTER — APPOINTMENT (OUTPATIENT)
Dept: CARDIOLOGY | Facility: CLINIC | Age: 54
End: 2024-06-12

## 2024-07-31 ENCOUNTER — RX RENEWAL (OUTPATIENT)
Age: 54
End: 2024-07-31

## 2024-10-19 ENCOUNTER — APPOINTMENT (OUTPATIENT)
Dept: ENDOCRINOLOGY | Facility: CLINIC | Age: 54
End: 2024-10-19
Payer: MEDICARE

## 2024-10-19 VITALS
WEIGHT: 259 LBS | BODY MASS INDEX: 36.26 KG/M2 | HEIGHT: 71 IN | SYSTOLIC BLOOD PRESSURE: 200 MMHG | DIASTOLIC BLOOD PRESSURE: 99 MMHG | OXYGEN SATURATION: 100 % | HEART RATE: 92 BPM

## 2024-10-19 DIAGNOSIS — E11.39 TYPE 2 DIABETES MELLITUS WITH OTHER DIABETIC OPHTHALMIC COMPLICATION: ICD-10-CM

## 2024-10-19 DIAGNOSIS — E78.00 PURE HYPERCHOLESTEROLEMIA, UNSPECIFIED: ICD-10-CM

## 2024-10-19 DIAGNOSIS — E29.1 TESTICULAR HYPOFUNCTION: ICD-10-CM

## 2024-10-19 DIAGNOSIS — I10 ESSENTIAL (PRIMARY) HYPERTENSION: ICD-10-CM

## 2024-10-19 PROCEDURE — 99214 OFFICE O/P EST MOD 30 MIN: CPT

## 2024-10-19 RX ORDER — LANCETS
EACH MISCELLANEOUS
Qty: 360 | Refills: 3 | Status: ACTIVE | COMMUNITY
Start: 2024-10-19 | End: 1900-01-01

## 2024-10-19 RX ORDER — TIRZEPATIDE 7.5 MG/.5ML
7.5 INJECTION, SOLUTION SUBCUTANEOUS
Qty: 4 | Refills: 11 | Status: ACTIVE | COMMUNITY
Start: 2024-10-19 | End: 1900-01-01

## 2024-11-02 ENCOUNTER — RX RENEWAL (OUTPATIENT)
Age: 54
End: 2024-11-02

## 2024-11-02 RX ORDER — TIRZEPATIDE 5 MG/.5ML
5 INJECTION, SOLUTION SUBCUTANEOUS
Qty: 12 | Refills: 3 | Status: ACTIVE | COMMUNITY
Start: 2024-11-02 | End: 1900-01-01

## 2024-12-31 ENCOUNTER — NON-APPOINTMENT (OUTPATIENT)
Age: 54
End: 2024-12-31

## 2025-01-02 ENCOUNTER — RX RENEWAL (OUTPATIENT)
Age: 55
End: 2025-01-02

## 2025-01-06 ENCOUNTER — RX RENEWAL (OUTPATIENT)
Age: 55
End: 2025-01-06

## 2025-02-18 ENCOUNTER — RX RENEWAL (OUTPATIENT)
Age: 55
End: 2025-02-18

## 2025-02-19 ENCOUNTER — APPOINTMENT (OUTPATIENT)
Dept: CARDIOLOGY | Facility: CLINIC | Age: 55
End: 2025-02-19
Payer: MEDICARE

## 2025-02-19 VITALS — BODY MASS INDEX: 37.52 KG/M2 | WEIGHT: 268 LBS | HEIGHT: 71 IN

## 2025-02-19 VITALS — DIASTOLIC BLOOD PRESSURE: 90 MMHG | SYSTOLIC BLOOD PRESSURE: 202 MMHG | HEART RATE: 80 BPM

## 2025-02-19 DIAGNOSIS — E78.00 PURE HYPERCHOLESTEROLEMIA, UNSPECIFIED: ICD-10-CM

## 2025-02-19 DIAGNOSIS — E11.39 TYPE 2 DIABETES MELLITUS WITH OTHER DIABETIC OPHTHALMIC COMPLICATION: ICD-10-CM

## 2025-02-19 DIAGNOSIS — I10 ESSENTIAL (PRIMARY) HYPERTENSION: ICD-10-CM

## 2025-02-19 DIAGNOSIS — R00.2 PALPITATIONS: ICD-10-CM

## 2025-02-19 DIAGNOSIS — E29.1 TESTICULAR HYPOFUNCTION: ICD-10-CM

## 2025-02-19 PROCEDURE — 99214 OFFICE O/P EST MOD 30 MIN: CPT

## 2025-02-19 PROCEDURE — G2211 COMPLEX E/M VISIT ADD ON: CPT

## 2025-02-19 PROCEDURE — 93000 ELECTROCARDIOGRAM COMPLETE: CPT

## 2025-02-19 RX ORDER — OLMESARTAN MEDOXOMIL AND HYDROCHLOROTHIAZIDE 40; 12.5 MG/1; MG/1
40-12.5 TABLET ORAL DAILY
Qty: 90 | Refills: 3 | Status: ACTIVE | COMMUNITY
Start: 2025-02-19 | End: 1900-01-01

## 2025-02-19 RX ORDER — METOPROLOL TARTRATE 100 MG/1
100 TABLET ORAL
Qty: 4 | Refills: 0 | Status: ACTIVE | COMMUNITY
Start: 2025-02-19 | End: 1900-01-01

## 2025-02-19 RX ORDER — METOPROLOL SUCCINATE 50 MG/1
50 TABLET, EXTENDED RELEASE ORAL
Refills: 0 | Status: ACTIVE | COMMUNITY

## 2025-02-22 ENCOUNTER — APPOINTMENT (OUTPATIENT)
Dept: ENDOCRINOLOGY | Facility: CLINIC | Age: 55
End: 2025-02-22

## 2025-02-22 VITALS
BODY MASS INDEX: 36.82 KG/M2 | OXYGEN SATURATION: 94 % | WEIGHT: 263 LBS | HEIGHT: 71 IN | DIASTOLIC BLOOD PRESSURE: 77 MMHG | SYSTOLIC BLOOD PRESSURE: 153 MMHG | BODY MASS INDEX: 36.26 KG/M2 | HEART RATE: 66 BPM | WEIGHT: 260 LBS

## 2025-02-22 DIAGNOSIS — N40.0 BENIGN PROSTATIC HYPERPLASIA WITHOUT LOWER URINARY TRACT SYMPMS: ICD-10-CM

## 2025-02-22 PROCEDURE — 99214 OFFICE O/P EST MOD 30 MIN: CPT

## 2025-02-22 RX ORDER — OLMESARTAN MEDOXOMIL 40 MG/1
40 TABLET, FILM COATED ORAL DAILY
Qty: 90 | Refills: 3 | Status: ACTIVE | COMMUNITY
Start: 2025-02-22 | End: 1900-01-01

## 2025-02-22 RX ORDER — ALPRAZOLAM 0.25 MG/1
0.25 TABLET ORAL
Qty: 60 | Refills: 0 | Status: ACTIVE | COMMUNITY
Start: 2025-02-22 | End: 1900-01-01

## 2025-03-17 ENCOUNTER — APPOINTMENT (OUTPATIENT)
Dept: CARDIOLOGY | Facility: CLINIC | Age: 55
End: 2025-03-17
Payer: MEDICARE

## 2025-03-17 PROCEDURE — 93880 EXTRACRANIAL BILAT STUDY: CPT

## 2025-04-02 ENCOUNTER — RX RENEWAL (OUTPATIENT)
Age: 55
End: 2025-04-02

## 2025-04-10 ENCOUNTER — TRANSCRIPTION ENCOUNTER (OUTPATIENT)
Age: 55
End: 2025-04-10

## 2025-04-10 ENCOUNTER — APPOINTMENT (OUTPATIENT)
Dept: CARDIOLOGY | Facility: CLINIC | Age: 55
End: 2025-04-10
Payer: MEDICARE

## 2025-04-10 PROCEDURE — 76775 US EXAM ABDO BACK WALL LIM: CPT

## 2025-06-09 ENCOUNTER — NON-APPOINTMENT (OUTPATIENT)
Age: 55
End: 2025-06-09

## 2025-06-11 ENCOUNTER — APPOINTMENT (OUTPATIENT)
Dept: CARDIOLOGY | Facility: CLINIC | Age: 55
End: 2025-06-11
Payer: MEDICARE

## 2025-06-11 VITALS
HEART RATE: 96 BPM | WEIGHT: 272 LBS | OXYGEN SATURATION: 99 % | SYSTOLIC BLOOD PRESSURE: 170 MMHG | HEIGHT: 71 IN | BODY MASS INDEX: 38.08 KG/M2 | DIASTOLIC BLOOD PRESSURE: 90 MMHG

## 2025-06-11 VITALS — DIASTOLIC BLOOD PRESSURE: 82 MMHG | SYSTOLIC BLOOD PRESSURE: 162 MMHG

## 2025-06-11 PROCEDURE — G2211 COMPLEX E/M VISIT ADD ON: CPT

## 2025-06-11 PROCEDURE — 99214 OFFICE O/P EST MOD 30 MIN: CPT

## 2025-06-28 ENCOUNTER — APPOINTMENT (OUTPATIENT)
Dept: ENDOCRINOLOGY | Facility: CLINIC | Age: 55
End: 2025-06-28

## 2025-06-28 VITALS
OXYGEN SATURATION: 98 % | HEIGHT: 71 IN | BODY MASS INDEX: 39.2 KG/M2 | SYSTOLIC BLOOD PRESSURE: 140 MMHG | HEART RATE: 80 BPM | WEIGHT: 280 LBS | DIASTOLIC BLOOD PRESSURE: 80 MMHG

## 2025-06-28 PROCEDURE — 99214 OFFICE O/P EST MOD 30 MIN: CPT

## 2025-07-28 ENCOUNTER — RX RENEWAL (OUTPATIENT)
Age: 55
End: 2025-07-28